# Patient Record
Sex: FEMALE | Race: WHITE | NOT HISPANIC OR LATINO | Employment: FULL TIME | URBAN - METROPOLITAN AREA
[De-identification: names, ages, dates, MRNs, and addresses within clinical notes are randomized per-mention and may not be internally consistent; named-entity substitution may affect disease eponyms.]

---

## 2021-04-13 ENCOUNTER — OFFICE VISIT (OUTPATIENT)
Dept: URGENT CARE | Facility: MEDICAL CENTER | Age: 51
End: 2021-04-13
Payer: COMMERCIAL

## 2021-04-13 ENCOUNTER — APPOINTMENT (OUTPATIENT)
Dept: RADIOLOGY | Facility: MEDICAL CENTER | Age: 51
End: 2021-04-13
Payer: COMMERCIAL

## 2021-04-13 ENCOUNTER — TELEPHONE (OUTPATIENT)
Dept: URGENT CARE | Facility: MEDICAL CENTER | Age: 51
End: 2021-04-13

## 2021-04-13 VITALS
HEART RATE: 67 BPM | SYSTOLIC BLOOD PRESSURE: 128 MMHG | RESPIRATION RATE: 16 BRPM | TEMPERATURE: 99.2 F | OXYGEN SATURATION: 98 % | DIASTOLIC BLOOD PRESSURE: 78 MMHG

## 2021-04-13 DIAGNOSIS — S93.401A SPRAIN OF RIGHT ANKLE, UNSPECIFIED LIGAMENT, INITIAL ENCOUNTER: Primary | ICD-10-CM
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
## 2021-04-13 PROCEDURE — G0382 LEV 3 HOSP TYPE B ED VISIT: HCPCS | Performed by: PHYSICIAN ASSISTANT

## 2021-04-13 PROCEDURE — 73610 X-RAY EXAM OF ANKLE: CPT

## 2021-04-13 PROCEDURE — 73630 X-RAY EXAM OF FOOT: CPT

## 2021-04-13 NOTE — TELEPHONE ENCOUNTER
Patient was called and told no fractures identified on x-ray  Swelling noted on right lateral ankle

## 2021-04-13 NOTE — PROGRESS NOTES
[facility]        NAME: Desire Herzog is a 48 y o  female  : 1970    MRN: [id number]  DATE: 2021  TIME: 2:05 PM    Assessment and Plan   Sprain of right ankle, unspecified ligament, initial encounter [S93 [id number]]  1  Sprain of right ankle, unspecified ligament, initial encounter  Ambulatory referral to Orthopedic Surgery   2  Fall, initial encounter  XR ankle 3+ vw right    XR foot 3+ vw right     X-rays of right ankle and right foot were negative for acute fractures and dislocations  Pending radiology report  Patient Instructions     Patient was educated on Right ankle sprain  Patient was educated on icing and taking OTC Tylenol and Ibuprofen for the pain  Patient was educated on right ankle brace  Patient was told if symptoms persist to follow up with Ortho    Chief Complaint     Chief Complaint   Patient presents with    Ankle Injury     Patient relates she was going down stairs yesterday at 7:00 am and tripped on something on the steps and fell down 3 steps  Right leg went backwards  C/O right ankle and right foot pain  Denies head injury  No blood thinners  History of Present Illness       Patient is here today for right foot and right ankle pain  Patient reports on 21 she was walking down the stairs and missed three steps  Denies hitting her head  Patient rates pain a 5-6/10  Patient is currently taking Tylenol for the pain  Patient denies any prior right ankle injuries  Review of Systems   Review of Systems   Constitutional: Negative  HENT: Negative  Respiratory: Negative  Cardiovascular: Negative  Musculoskeletal:        Right ankle and right foot pain   Neurological: Negative  Psychiatric/Behavioral: Negative            Current Medications       Current Outpatient Medications:     Cholecalciferol 50 MCG ( [place]) CAPS, Take 2,000 Units by mouth daily, Disp: , Rfl:     Current Allergies     Allergies as of 2021    (No Known Allergies)            The following portions of the patient's history were reviewed and updated as appropriate: allergies, current medications, past family history, past medical history, past social history, past surgical history and problem list      History reviewed  No pertinent past medical history  Past Surgical History:   Procedure Laterality Date    KNEE SURGERY Left     torn menicus    NECK SURGERY      C-6       History reviewed  No pertinent family history  Medications have been verified  Objective   /78   Pulse 67   Temp 99 2 °F (37 3 °C) (Tympanic)   Resp 16   SpO2 98%   No LMP recorded  Patient is postmenopausal        Physical Exam     Physical Exam  Constitutional:       Appearance: She is normal weight  HENT:      Head: Normocephalic  Cardiovascular:      Rate and Rhythm: Normal rate and regular rhythm  Heart sounds: Normal heart sounds  Pulmonary:      Breath sounds: Normal breath sounds  Musculoskeletal:      Comments: No pain or tenderness over medial or lateral malleolus  DF/PF/inversion and eversion 5/5 with pain  No pain noted over meta-tarsals  Dorsal pedal pulse intact in right foot  NO pain over right knee joint  Swelling noted over right ankle  Patient is neurovascularly intact  Neurological:      Mental Status: She is alert and oriented to person, place, and time     Psychiatric:         Mood and Affect: Mood normal          Behavior: Behavior normal

## 2023-12-08 ENCOUNTER — TELEPHONE (OUTPATIENT)
Dept: PSYCHIATRY | Facility: CLINIC | Age: 53
End: 2023-12-08

## 2023-12-08 NOTE — TELEPHONE ENCOUNTER
Called pt from [facility] wait list for services. Phone number on file is for "[facility]". Writer not able to speak to pt or leave a vm.